# Patient Record
Sex: FEMALE | Race: WHITE | NOT HISPANIC OR LATINO | Employment: FULL TIME | URBAN - NONMETROPOLITAN AREA
[De-identification: names, ages, dates, MRNs, and addresses within clinical notes are randomized per-mention and may not be internally consistent; named-entity substitution may affect disease eponyms.]

---

## 2017-11-25 ENCOUNTER — OFFICE VISIT (OUTPATIENT)
Dept: RETAIL CLINIC | Facility: CLINIC | Age: 57
End: 2017-11-25

## 2017-11-25 VITALS — HEART RATE: 85 BPM | WEIGHT: 141 LBS | TEMPERATURE: 97.9 F | OXYGEN SATURATION: 98 % | RESPIRATION RATE: 18 BRPM

## 2017-11-25 DIAGNOSIS — J01.00 ACUTE NON-RECURRENT MAXILLARY SINUSITIS: Primary | ICD-10-CM

## 2017-11-25 PROCEDURE — 99203 OFFICE O/P NEW LOW 30 MIN: CPT | Performed by: NURSE PRACTITIONER

## 2017-11-25 RX ORDER — AMOXICILLIN 875 MG/1
875 TABLET, COATED ORAL 3 TIMES DAILY
Qty: 30 TABLET | Refills: 0 | Status: SHIPPED | OUTPATIENT
Start: 2017-11-25 | End: 2017-12-05

## 2017-11-25 RX ORDER — ALBUTEROL SULFATE 90 UG/1
2 AEROSOL, METERED RESPIRATORY (INHALATION) EVERY 6 HOURS PRN
Qty: 1 INHALER | Refills: 0 | Status: SHIPPED | OUTPATIENT
Start: 2017-11-25 | End: 2017-12-02

## 2017-11-25 NOTE — PROGRESS NOTES
URI      Subjective   Joyce Hennessy is a 57 y.o. female.     URI    This is a new problem. Episode onset: 1 week ago  There has been no fever. Associated symptoms include congestion, coughing, rhinorrhea and a sore throat (dry; scratchy ). Pertinent negatives include no abdominal pain, chest pain, diarrhea, ear pain, headaches, nausea, rash, sinus pain, vomiting or wheezing. Treatments tried: Gretchen [place], Tylenol; Claritin D. The treatment provided mild relief.    Has not had influenza vaccine.  Ill contacts at work.  States that Dr. Campbell usually gives her Levaquin.  See ROS.     There is no problem list on file for this patient.      No Known Allergies     No current outpatient prescriptions on file prior to visit.     No current facility-administered medications on file prior to visit.        Past Medical History:   Diagnosis Date   • Allergic    • Bronchitis    • Sinusitis        Past Surgical History:   Procedure Laterality Date   • CARPAL TUNNEL RELEASE Right 2000   • CHOLECYSTECTOMY  2008   • HYSTERECTOMY  12/2001       Family History   Problem Relation Age of Onset   • Heart disease Father    • Hypertension Mother    • No Known Problems Sister    • No Known Problems Brother    • No Known Problems Brother    • No Known Problems Brother        Social History     Social History   • Marital status:      Spouse name: N/A   • Number of children: N/A   • Years of education: N/A     Occupational History   • Not on file.     Social History Main Topics   • Smoking status: Never Smoker   • Smokeless tobacco: Never Used   • Alcohol use No   • Drug use: No   • Sexual activity: Defer     Other Topics Concern   • Not on file     Social History Narrative   • No narrative on file       Travel:  No recent travel within the last 21 days outside the [place] Denies recent travel to one of the following [place]  Countries:  [place], [place], [place], or [place].  Denies contact with anyone who has traveled to  one of the following [place]  Countries: [place], [place], [place], or [place] within the last 21 days and is known or suspected to have Ebola.  Denies having had any contact with the human remains, blood or any bodily fluids of someone who is known or suspected to have Ebola within the last 21 days.     OB History     No data available          Review of Systems   Constitutional: Negative for chills, diaphoresis and fever.   HENT: Positive for congestion, hearing loss (fullness; R>L), postnasal drip, rhinorrhea, sinus pressure, sore throat (dry; scratchy ) and voice change (hoarseness ). Negative for ear discharge, ear pain, mouth sores, nosebleeds and sinus pain.    Respiratory: Positive for cough and chest tightness. Negative for shortness of breath and wheezing.    Cardiovascular: Negative for chest pain.   Gastrointestinal: Negative for abdominal pain, diarrhea, nausea and vomiting.   Musculoskeletal: Negative for myalgias.   Skin: Negative for rash.   Neurological: Negative for dizziness and headaches.       Pulse 85  Temp 97.9 °F (36.6 °C) (Oral)   Resp 18  Wt 141 lb (64 kg)  SpO2 98%  Breastfeeding? No    Objective   Physical Exam   Constitutional: She is oriented to person, place, and time. She appears well-developed and well-nourished. She is cooperative. She appears ill (mild ). No distress.   HENT:   Head: Normocephalic and atraumatic.   Right Ear: External ear and ear canal normal. Tympanic membrane is not perforated, not erythematous, not retracted and not bulging. A middle ear effusion (serous ) is present.   Left Ear: External ear and ear canal normal. Tympanic membrane is not perforated, not erythematous, not retracted and not bulging. A middle ear effusion (serous ) is present.   Nose: No rhinorrhea. Right sinus exhibits maxillary sinus tenderness and frontal sinus tenderness. Left sinus exhibits maxillary sinus tenderness and frontal sinus tenderness.   Mouth/Throat: Uvula is midline,  oropharynx is clear and moist and mucous membranes are normal. No tonsillar exudate.   Bilateral nasal congestion, clear PND   Cardiovascular: Normal rate, regular rhythm and normal heart sounds.    Pulmonary/Chest: Effort normal and breath sounds normal.   Lymphadenopathy:        Head (right side): Tonsillar adenopathy present.        Head (left side): Tonsillar adenopathy present.     She has no cervical adenopathy.   Neurological: She is alert and oriented to person, place, and time.   Skin: Skin is warm, dry and intact. No rash noted.       Assessment/Plan   Joyce was seen today for uri.    Diagnoses and all orders for this visit:    Acute non-recurrent maxillary sinusitis  -     amoxicillin (AMOXIL) 875 MG tablet; Take 1 tablet by mouth 3 (Three) Times a Day for 10 days.  -     albuterol (PROVENTIL HFA;VENTOLIN HFA) 108 (90 Base) MCG/ACT inhaler; Inhale 2 puffs Every 6 (Six) Hours As Needed for Wheezing or Shortness of Air for up to 7 days.      Discussed physical examination findings with patient today.  Increase water intake.  Continue taking Claritin D for congestion and fluid behind TMs. Tylenol or Motrin ok for pain.  Follow up with PCP if symptoms worsen or do not improve.  Visit summary provided today.      Return if symptoms worsen or fail to improve.

## 2018-09-29 ENCOUNTER — OFFICE VISIT (OUTPATIENT)
Dept: RETAIL CLINIC | Facility: CLINIC | Age: 58
End: 2018-09-29

## 2018-09-29 VITALS
RESPIRATION RATE: 20 BRPM | WEIGHT: 140 LBS | TEMPERATURE: 97.4 F | DIASTOLIC BLOOD PRESSURE: 68 MMHG | SYSTOLIC BLOOD PRESSURE: 110 MMHG | OXYGEN SATURATION: 97 % | HEART RATE: 61 BPM

## 2018-09-29 DIAGNOSIS — H65.06 RECURRENT ACUTE SEROUS OTITIS MEDIA OF BOTH EARS: ICD-10-CM

## 2018-09-29 DIAGNOSIS — J01.40 ACUTE NON-RECURRENT PANSINUSITIS: Primary | ICD-10-CM

## 2018-09-29 PROCEDURE — 99213 OFFICE O/P EST LOW 20 MIN: CPT | Performed by: NURSE PRACTITIONER

## 2018-09-29 RX ORDER — FLUTICASONE PROPIONATE 50 MCG
2 SPRAY, SUSPENSION (ML) NASAL DAILY
COMMUNITY

## 2018-09-29 RX ORDER — PREDNISONE 20 MG/1
TABLET ORAL
Qty: 6 TABLET | Refills: 0 | Status: SHIPPED | OUTPATIENT
Start: 2018-09-29

## 2018-09-29 RX ORDER — MONTELUKAST SODIUM 10 MG/1
10 TABLET ORAL NIGHTLY
COMMUNITY

## 2018-09-29 RX ORDER — AMOXICILLIN AND CLAVULANATE POTASSIUM 875; 125 MG/1; MG/1
1 TABLET, FILM COATED ORAL EVERY 12 HOURS SCHEDULED
Qty: 20 TABLET | Refills: 0 | Status: SHIPPED | OUTPATIENT
Start: 2018-09-29 | End: 2018-10-09

## 2018-09-29 NOTE — PATIENT INSTRUCTIONS
Eustachian Tube Dysfunction  The eustachian tube connects the middle ear to the back of the nose. It regulates air pressure in the middle ear by allowing air to move between the ear and nose. It also helps to drain fluid from the middle ear space. When the eustachian tube does not function properly, air pressure, fluid, or both can build up in the middle ear.  Eustachian tube dysfunction can affect one or both ears.  What are the causes?  This condition happens when the eustachian tube becomes blocked or cannot open normally. This may result from:  · Ear infections.  · Colds and other upper respiratory infections.  · Allergies.  · Irritation, such as from cigarette smoke or acid from the stomach coming up into the esophagus (gastroesophageal reflux).  · Sudden changes in air pressure, such as from descending in an airplane.  · Abnormal growths in the nose or throat, such as nasal polyps, tumors, or enlarged tissue at the back of the throat (adenoids).    What increases the risk?  This condition may be more likely to develop in people who smoke and people who are overweight. Eustachian tube dysfunction may also be more likely to develop in children, especially children who have:  · Certain birth defects of the mouth, such as cleft palate.  · Large tonsils and adenoids.    What are the signs or symptoms?  Symptoms of this condition may include:  · A feeling of fullness in the ear.  · Ear pain.  · Clicking or popping noises in the ear.  · Ringing in the ear.  · Hearing loss.  · Loss of balance.    Symptoms may get worse when the air pressure around you changes, such as when you travel to an area of high elevation or fly on an airplane.  How is this diagnosed?  This condition may be diagnosed based on:  · Your symptoms.  · A physical exam of your ear, nose, and throat.  · Tests, such as those that measure:  ? The movement of your eardrum (tympanogram).  ? Your hearing (audiometry).    How is this treated?  Treatment  "depends on the cause and severity of your condition. If your symptoms are mild, you may be able to relieve your symptoms by moving air into (\"popping\") your ears. If you have symptoms of fluid in your ears, treatment may include:  · Decongestants.  · Antihistamines.  · Nasal sprays or ear drops that contain medicines that reduce swelling (steroids).    In some cases, you may need to have a procedure to drain the fluid in your eardrum (myringotomy). In this procedure, a small tube is placed in the eardrum to:  · Drain the fluid.  · Restore the air in the middle ear space.    Follow these instructions at home:  · Take over-the-counter and prescription medicines only as told by your health care provider.  · Use techniques to help pop your ears as recommended by your health care provider. These may include:  ? Chewing gum.  ? Yawning.  ? Frequent, forceful swallowing.  ? Closing your mouth, holding your nose closed, and gently blowing as if you are trying to blow air out of your nose.  · Do not do any of the following until your health care provider approves:  ? Travel to high altitudes.  ? Fly in airplanes.  ? Work in a pressurized cabin or room.  ? Scuba dive.  · Keep your ears dry. Dry your ears completely after showering or bathing.  · Do not smoke.  · Keep all follow-up visits as told by your health care provider. This is important.  Contact a health care provider if:  · Your symptoms do not go away after treatment.  · Your symptoms come back after treatment.  · You are unable to pop your ears.  · You have:  ? A fever.  ? Pain in your ear.  ? Pain in your head or neck.  ? Fluid draining from your ear.  · Your hearing suddenly changes.  · You become very dizzy.  · You lose your balance.  This information is not intended to replace advice given to you by your health care provider. Make sure you discuss any questions you have with your health care provider.  Document Released: 01/13/2017 Document Revised: 05/25/2017 " Document Reviewed: 01/06/2016  [employer] Interactive Patient Education © 2018 [employer]  Sinusitis, Adult  Sinusitis is soreness and inflammation of your sinuses. Sinuses are hollow spaces in the bones around your face. Your sinuses are located:  · Around your eyes.  · In the middle of your forehead.  · Behind your nose.  · In your cheekbones.    Your sinuses and nasal passages are lined with a stringy fluid (mucus). Mucus normally drains out of your sinuses. When your nasal tissues become inflamed or swollen, the mucus can become trapped or blocked so air cannot flow through your sinuses. This allows bacteria, viruses, and funguses to grow, which leads to infection.  Sinusitis can develop quickly and last for 7?10 days (acute) or for more than 12 weeks (chronic). Sinusitis often develops after a cold.  What are the causes?  This condition is caused by anything that creates swelling in the sinuses or stops mucus from draining, including:  · Allergies.  · Asthma.  · Bacterial or viral infection.  · Abnormally shaped bones between the nasal passages.  · Nasal growths that contain mucus (nasal polyps).  · Narrow sinus openings.  · Pollutants, such as chemicals or irritants in the air.  · A foreign object stuck in the nose.  · A fungal infection. This is rare.    What increases the risk?  The following factors may make you more likely to develop this condition:  · Having allergies or asthma.  · Having had a recent cold or respiratory tract infection.  · Having structural deformities or blockages in your nose or sinuses.  · Having a weak immune system.  · Doing a lot of swimming or diving.  · Overusing nasal sprays.  · Smoking.    What are the signs or symptoms?  The main symptoms of this condition are pain and a feeling of pressure around the affected sinuses. Other symptoms include:  · Upper toothache.  · Earache.  · Headache.  · Bad breath.  · Decreased sense of smell and taste.  · A cough that may get worse at  night.  · Fatigue.  · Fever.  · Thick drainage from your nose. The drainage is often green and it may contain pus (purulent).  · Stuffy nose or congestion.  · Postnasal drip. This is when extra mucus collects in the throat or back of the nose.  · Swelling and warmth over the affected sinuses.  · Sore throat.  · Sensitivity to light.    How is this diagnosed?  This condition is diagnosed based on symptoms, a medical history, and a physical exam. To find out if your condition is acute or chronic, your health care provider may:  · Look in your nose for signs of nasal polyps.  · Tap over the affected sinus to check for signs of infection.  · View the inside of your sinuses using an imaging device that has a light attached (endoscope).    If your health care provider suspects that you have chronic sinusitis, you may also:  · Be tested for allergies.  · Have a sample of mucus taken from your nose (nasal culture) and checked for bacteria.  · Have a mucus sample examined to see if your sinusitis is related to an allergy.    If your sinusitis does not respond to treatment and it lasts longer than 8 weeks, you may have an MRI or CT scan to check your sinuses. These scans also help to determine how severe your infection is.  In rare cases, a bone biopsy may be done to rule out more serious types of fungal sinus disease.  How is this treated?  Treatment for sinusitis depends on the cause and whether your condition is chronic or acute. If a virus is causing your sinusitis, your symptoms will go away on their own within 10 days. You may be given medicines to relieve your symptoms, including:  · Topical nasal decongestants. They shrink swollen nasal passages and let mucus drain from your sinuses.  · Antihistamines. These drugs block inflammation that is triggered by allergies. This can help to ease swelling in your nose and sinuses.  · Topical nasal corticosteroids. These are nasal sprays that ease inflammation and swelling in  your nose and sinuses.  · Nasal saline washes. These rinses can help to get rid of thick mucus in your nose.    If your condition is caused by bacteria, you will be given an antibiotic medicine. If your condition is caused by a fungus, you will be given an antifungal medicine.  Surgery may be needed to correct underlying conditions, such as narrow nasal passages. Surgery may also be needed to remove polyps.  Follow these instructions at home:  Medicines  · Take, use, or apply over-the-counter and prescription medicines only as told by your health care provider. These may include nasal sprays.  · If you were prescribed an antibiotic medicine, take it as told by your health care provider. Do not stop taking the antibiotic even if you start to feel better.  Hydrate and Humidify  · Drink enough water to keep your urine clear or pale yellow. Staying hydrated will help to thin your mucus.  · Use a cool mist humidifier to keep the humidity level in your home above 50%.  · Inhale steam for 10-15 minutes, 3-4 times a day or as told by your health care provider. You can do this in the bathroom while a hot shower is running.  · Limit your exposure to cool or dry air.  Rest  · Rest as much as possible.  · Sleep with your head raised (elevated).  · Make sure to get enough sleep each night.  General instructions  · Apply a warm, moist washcloth to your face 3-4 times a day or as told by your health care provider. This will help with discomfort.  · Wash your hands often with soap and water to reduce your exposure to viruses and other germs. If soap and water are not available, use hand .  · Do not smoke. Avoid being around people who are smoking (secondhand smoke).  · Keep all follow-up visits as told by your health care provider. This is important.  Contact a health care provider if:  · You have a fever.  · Your symptoms get worse.  · Your symptoms do not improve within 10 days.  Get help right away if:  · You have a  severe headache.  · You have persistent vomiting.  · You have pain or swelling around your face or eyes.  · You have vision problems.  · You develop confusion.  · Your neck is stiff.  · You have trouble breathing.  This information is not intended to replace advice given to you by your health care provider. Make sure you discuss any questions you have with your health care provider.  Document Released: 12/18/2006 Document Revised: 08/13/2017 Document Reviewed: 10/12/2016  [employer] Interactive Patient Education © 2018 [employer]

## 2018-09-29 NOTE — PROGRESS NOTES
Earache      Subjective   Joyce Hennessy is a 57 y.o. female.     Earache    This is a recurrent problem. Episode onset: 1 month ago  The problem has been waxing and waning. Associated symptoms include coughing, headaches, rhinorrhea and a sore throat (raw; irritated ). Pertinent negatives include no abdominal pain, diarrhea, ear discharge, rash or vomiting. Treatments tried: Singulair; Flonase, Claritin D  The treatment provided mild relief.    Went to see Doctor at work 1 month ago and diagnosed with allergies and eustachian tube dysfunction.  No  Recent vaccines.  Has not had influenza vaccine.  See ROS.      There is no problem list on file for this patient.      No Known Allergies     Current Outpatient Prescriptions on File Prior to Visit   Medication Sig Dispense Refill   • Ascorbic Acid (VITAMIN C ER PO) Take  by mouth.     • Multiple Vitamins-Minerals (MULTIVITAMIN ADULT PO) Take  by mouth.       No current facility-administered medications on file prior to visit.        Past Medical History:   Diagnosis Date   • Allergic    • Bronchitis    • Sinusitis        Past Surgical History:   Procedure Laterality Date   • CARPAL TUNNEL RELEASE Right 2000   • CHOLECYSTECTOMY  2008   • HYSTERECTOMY  12/2001       Family History   Problem Relation Age of Onset   • Heart disease Father    • Hypertension Mother    • No Known Problems Sister    • No Known Problems Brother    • No Known Problems Brother    • No Known Problems Brother        Social History     Social History   • Marital status:      Spouse name: N/A   • Number of children: N/A   • Years of education: N/A     Occupational History   • Not on file.     Social History Main Topics   • Smoking status: Never Smoker   • Smokeless tobacco: Never Used   • Alcohol use No   • Drug use: No   • Sexual activity: Defer     Other Topics Concern   • Not on file     Social History Narrative   • No narrative on file       Travel:  No recent travel within the last 21  days outside the [place] Denies recent travel to one of the following [place]ries:  [place], [place], [place], or [place].  Denies contact with anyone who has traveled to one of the following [place]  Countries: [place], [place], [place], or [place] within the last 21 days and is known or suspected to have Ebola.  Denies having had any contact with the human remains, blood or any bodily fluids of someone who is known or suspected to have Ebola within the last 21 days.     OB History     No data available          Review of Systems   Constitutional: Negative for chills, diaphoresis and fever.   HENT: Positive for congestion, postnasal drip, rhinorrhea, sinus pain, sinus pressure, sneezing and sore throat (raw; irritated ). Negative for dental problem, ear discharge, ear pain (ear fullness ), mouth sores, nosebleeds and voice change.    Respiratory: Positive for cough and chest tightness. Negative for shortness of breath and wheezing.    Cardiovascular: Negative for chest pain.   Gastrointestinal: Negative for abdominal pain, diarrhea, nausea and vomiting.   Musculoskeletal: Negative for myalgias.   Skin: Negative for rash.   Neurological: Positive for headaches.       /68 (BP Location: Right arm, Patient Position: Sitting, Cuff Size: Adult)   Pulse 61   Temp 97.4 °F (36.3 °C) (Temporal Artery )   Resp 20   Wt 63.5 kg (140 lb)   SpO2 97%     Objective   Physical Exam   Constitutional: She is oriented to person, place, and time. Vital signs are normal. She appears well-developed and well-nourished. She is cooperative. She does not appear ill. No distress.   HENT:   Head: Normocephalic.   Right Ear: External ear and ear canal normal. Tympanic membrane is not scarred, not perforated, not erythematous, not retracted and not bulging. A middle ear effusion (moderate serous effusion ) is present.   Left Ear: External ear and ear canal normal. Tympanic membrane is not scarred, not perforated, not  erythematous, not retracted and not bulging. A middle ear effusion (moderate serous effusion ) is present.   Nose: Right sinus exhibits maxillary sinus tenderness and frontal sinus tenderness. Left sinus exhibits maxillary sinus tenderness and frontal sinus tenderness.   Mouth/Throat: Uvula is midline, oropharynx is clear and moist and mucous membranes are normal. No posterior oropharyngeal edema or posterior oropharyngeal erythema. No tonsillar exudate.   Moderate nasal congestion    Cardiovascular: Normal rate, regular rhythm and normal heart sounds.    Pulmonary/Chest: Effort normal and breath sounds normal. She has no decreased breath sounds. She has no wheezes. She has no rhonchi. She has no rales.   Lymphadenopathy:        Head (right side): Tonsillar adenopathy present. No preauricular and no posterior auricular adenopathy present.        Head (left side): Tonsillar adenopathy present. No preauricular and no posterior auricular adenopathy present.     She has no cervical adenopathy.   Neurological: She is alert and oriented to person, place, and time.   Skin: Skin is warm, dry and intact. No rash noted.       Assessment/Plan   Joyce was seen today for earache.    Diagnoses and all orders for this visit:    Acute non-recurrent pansinusitis  -     predniSONE (DELTASONE) 20 MG tablet; Take 2 tablets PO daily x 3 days  -     amoxicillin-clavulanate (AUGMENTIN) 875-125 MG per tablet; Take 1 tablet by mouth Every 12 (Twelve) Hours for 10 days.    Recurrent acute serous otitis media of both ears  -     predniSONE (DELTASONE) 20 MG tablet; Take 2 tablets PO daily x 3 days    Increase water intake. Rest.  Take prednisone with food and/or milk.  Follow up with PCP if symptoms persist or worsen.  Visit summary provided today.  Questions/concerns addressed.      No results found for this or any previous visit.    Return if symptoms worsen or fail to improve.

## 2024-11-07 ENCOUNTER — OFFICE VISIT (OUTPATIENT)
Dept: NEUROLOGY | Facility: CLINIC | Age: 64
End: 2024-11-07
Payer: MEDICARE

## 2024-11-07 VITALS
HEIGHT: 62 IN | HEART RATE: 78 BPM | TEMPERATURE: 98 F | WEIGHT: 108 LBS | SYSTOLIC BLOOD PRESSURE: 114 MMHG | DIASTOLIC BLOOD PRESSURE: 78 MMHG | OXYGEN SATURATION: 96 % | BODY MASS INDEX: 19.88 KG/M2

## 2024-11-07 DIAGNOSIS — R29.898 WEAKNESS OF BOTH UPPER EXTREMITIES: ICD-10-CM

## 2024-11-07 DIAGNOSIS — R25.8 BRADYKINESIA: ICD-10-CM

## 2024-11-07 DIAGNOSIS — R29.898 WEAKNESS OF BOTH LOWER EXTREMITIES: ICD-10-CM

## 2024-11-07 DIAGNOSIS — R26.9 GAIT ABNORMALITY: Primary | ICD-10-CM

## 2024-11-07 RX ORDER — TRIAMTERENE AND HYDROCHLOROTHIAZIDE 37.5; 25 MG/1; MG/1
TABLET ORAL
COMMUNITY

## 2024-11-07 RX ORDER — LORATADINE 10 MG/1
1 TABLET ORAL DAILY
COMMUNITY

## 2024-11-07 NOTE — PROGRESS NOTES
New Patient Office Visit      Patient Name: Joyce Hennessy  : 1960   MRN: [id number]     Referring Physician: Leandra Martines A*    Chief Complaint:    Chief Complaint   Patient presents with   • Establish Care     Weakness BLE x 6-8 months; patient has h/o hypokalemia; patient also reports BUE weakness (right worse than left)       History of Present Illness: Joyce Hennessy is a 64 y.o. female who is here today to establish care with Neurology.  Is accompanied to the office visit today by her daughter, Vangie.  She says she had weakness in Oct 2023 and states her K+ had been running low. She has had this several times since. She says this is about the time her weakness started on the right side. She also noted her B12 was low at that time too and had injections. She is now on oral B vitamin. She says she has weakness in general but more noted on the right side.   Denies any changes in speech, denies any dizziness, no visual changes like double vision or blurred vision. She says she has some occasional tremors in both hands when she is up and about but never at rest. She has difficulty cutting her food and removing lids.       Pertinent Medical History: Hysterectomy, vitamin B deficiency    Subjective      Review of Systems:   Review of Systems   Musculoskeletal:  Positive for gait problem.       Past Medical History:   Past Medical History:   Diagnosis Date   • Allergic    • Bronchitis    • Hypokalemia    • Sinusitis        Past Surgical History:   Past Surgical History:   Procedure Laterality Date   • CARPAL TUNNEL RELEASE Right    • CHOLECYSTECTOMY     • HYSTERECTOMY  2001       Family History:   Family History   Problem Relation Age of Onset   • Heart disease Father    • Hypertension Mother    • No Known Problems Sister    • No Known Problems Brother    • No Known Problems Brother    • No Known Problems Brother        Social History:   Social History     Socioeconomic History  "  • Marital status:    Tobacco Use   • Smoking status: Never   • Smokeless tobacco: Never   Substance and Sexual Activity   • Alcohol use: No   • Drug use: No   • Sexual activity: Defer     Birth control/protection: Surgical, Post-menopausal       Medications:     Current Outpatient Medications:   •  Ascorbic Acid (VITAMIN C ER PO), Take  by mouth., Disp: , Rfl:   •  B Complex Vitamins (VITAMIN B COMPLEX PO), Take  by mouth., Disp: , Rfl:   •  fluticasone (FLONASE) 50 MCG/ACT nasal spray, Administer 2 sprays into the nostril(s) as directed by provider Daily., Disp: , Rfl:   •  loratadine (CLARITIN) 10 MG tablet, Take 1 tablet by mouth Daily., Disp: , Rfl:   •  montelukast (SINGULAIR) 10 MG tablet, Take 1 tablet by mouth Every Night., Disp: , Rfl:   •  Multiple Vitamins-Minerals (MULTIVITAMIN ADULT PO), Take  by mouth., Disp: , Rfl:   •  omeprazole (priLOSEC) 20 MG capsule, Take 1 capsule by mouth Daily., Disp: , Rfl:   •  triamterene-hydrochlorothiazide (MAXZIDE-25) 37.5-25 MG per tablet, take one (1) tablet by mouth every day, Disp: , Rfl:     Allergies:   No Known Allergies    Objective     Physical Exam:  Vital Signs:   Vitals:    11/07/24 1004   BP: 114/78   Pulse: 78   Temp: 98 °F (36.7 °C)   SpO2: 96%   Weight: 49 kg (108 lb)   Height: 157.5 cm (62\")   PainSc: 0-No pain     Body mass index is 19.75 kg/m².     Physical Exam  Constitutional:       Appearance: Normal appearance.   HENT:      Head: Normocephalic.   Eyes:      General: Lids are normal.      Extraocular Movements: Extraocular movements intact.      Conjunctiva/sclera: Conjunctivae normal.   Pulmonary:      Effort: Pulmonary effort is normal.   Musculoskeletal:         General: Normal range of motion.   Skin:     General: Skin is warm and dry.   Neurological:      General: No focal deficit present.      Mental Status: She is alert and oriented to person, place, and time.      Cranial Nerves: Cranial nerves 2-12 are intact. No dysarthria.      " "Motor: Motor function is intact. No tremor or pronator drift.      Coordination: Coordination is intact. Romberg sign negative. Finger-Nose-Finger Test normal.      Gait: Gait abnormal.      Deep Tendon Reflexes:      Reflex Scores:       Bicep reflexes are 3+ on the right side and 3+ on the left side.       Brachioradialis reflexes are 3+ on the right side and 3+ on the left side.       Patellar reflexes are 3+ on the right side and 3+ on the left side.     Comments: Patient is able to go from sitting to standing on first attempt while pushing up off of the chair.  Her gait is forward flexed.  She takes short steps.  She is able to complete a turn in 4 steps.  She notes that today is \"a good day \".  No tremor noted with ambulation or at rest.  She does have a fine hand tremor when assessing hand    Psychiatric:         Attention and Perception: Attention normal.         Mood and Affect: Mood normal. Affect is flat.         Speech: Speech normal.         Behavior: Behavior normal. Behavior is cooperative.         Thought Content: Thought content normal.         Cognition and Memory: Cognition normal.         Judgment: Judgment normal.         Neurological Exam  Mental Status  Alert. Oriented to person, place, time and situation. Oriented to person, place, and time. Recent and remote memory are intact. Speech is normal. no dysarthria present. Language is fluent with no aphasia. Attention and concentration are normal.    Cranial Nerves  CN III, IV, VI: Extraocular movements intact bilaterally. Normal lids and orbits bilaterally.  CN V: Facial sensation is normal.  CN VII: Full and symmetric facial movement.  CN IX, X: Palate elevates symmetrically  CN XI: Shoulder shrug strength is normal.  CN XII: Tongue midline without atrophy or fasciculations.    Motor  Decreased muscle bulk throughout. No fasciculations present. Decreased muscle tone. No abnormal involuntary movements.                                         "       Right                     Left  Deltoid                                   3                          3   Biceps                                   3                          3   Triceps                                  3                          3   Quadriceps                           3-                          3-   Hamstring                             3-                          3-   Gastrocnemius                     3-                           3-   Anterior tibialis                      3-                          3-   Posterior tibialis                    3-                          3-   Peroneal                               3-                          3-    Sensory  Sensation is intact to light touch, pinprick, vibration and proprioception in all four extremities.    Reflexes                                            Right                      Left  Brachioradialis                    3+                         3+  Biceps                                 3+                         3+  Patellar                                3+                         3+    Coordination    Finger-to-nose, rapid alternating movements and heel-to-shin normal bilaterally without dysmetria.No tremor    Gait   Abnormal gait. Romberg is absent.      Assessment / Plan      Assessment/Plan:   Diagnoses and all orders for this visit:    1. Gait abnormality (Primary)  -     Ambulatory Referral to Physical Therapy for Evaluation & Treatment  -     MRI Brain With & Without Contrast; Future    2. Weakness of both lower extremities  -     Ambulatory Referral to Physical Therapy for Evaluation & Treatment  -     MRI Brain With & Without Contrast; Future    3. Weakness of both upper extremities  -     Ambulatory Referral to Physical Therapy for Evaluation & Treatment  -     MRI Brain With & Without Contrast; Future    4. Bradykinesia     MRI of the brain with and without contrast has been ordered as she had a CT scan that was  noncontributory last month which said to consider MRI for clinical concern.  Due to patient's pronounced weakness in her upper and lower extremities I feel MRI is warranted to rule out any demyelinating disease as well as any vascular or structural abnormality that may be contributing to her symptoms.  Would like to rule out old CVA.  Also long discussion with patient and her daughter regarding parkinsonian features.  Patient has gait abnormality and bradykinesia.  Will send to physical therapy for strengthening and gait training.  She denies any falls in the last 6 months.  She does not walk with  any assistive device.  Discussed DaTscan with patient and her daughter.  They recommend this at her follow-up appointment.  She has had labs from her PCP on 10/7/2023 these were reviewed today including ferritin, vitamin D, folate, B12, TSH, iron profile, CBC and CMP were all noncontributory.  Her lipid panel revealed total cholesterol 213 and LDL of 104.     Patient will call in the interim if she has any questions or concerns or changes.    Follow Up:   Return in about 2 months (around 1/7/2025).    HOSSEIN Rodriguez, FNP-[facility] Neurology and Sleep Medicine

## 2025-02-06 ENCOUNTER — HOSPITAL ENCOUNTER (OUTPATIENT)
Dept: MRI IMAGING | Facility: HOSPITAL | Age: 65
Discharge: HOME | End: 2025-02-06
Admitting: NURSE PRACTITIONER
Payer: MEDICARE

## 2025-02-06 DIAGNOSIS — R29.898 WEAKNESS OF BOTH LOWER EXTREMITIES: ICD-10-CM

## 2025-02-06 DIAGNOSIS — R26.9 GAIT ABNORMALITY: ICD-10-CM

## 2025-02-06 DIAGNOSIS — R29.898 WEAKNESS OF BOTH UPPER EXTREMITIES: ICD-10-CM

## 2025-02-06 PROCEDURE — 25510000002 GADOBENATE DIMEGLUMINE 529 MG/ML SOLUTION: Performed by: NURSE PRACTITIONER

## 2025-02-06 PROCEDURE — 70553 MRI BRAIN STEM W/O & W/DYE: CPT

## 2025-02-06 PROCEDURE — A9577 INJ MULTIHANCE: HCPCS | Performed by: NURSE PRACTITIONER

## 2025-02-06 RX ADMIN — GADOBENATE DIMEGLUMINE 9 ML: 529 INJECTION, SOLUTION INTRAVENOUS at 10:43

## 2025-02-10 ENCOUNTER — OFFICE VISIT (OUTPATIENT)
Dept: NEUROLOGY | Facility: CLINIC | Age: 65
End: 2025-02-10
Payer: MEDICARE

## 2025-02-10 VITALS
WEIGHT: 110 LBS | HEART RATE: 90 BPM | HEIGHT: 62 IN | TEMPERATURE: 97.8 F | BODY MASS INDEX: 20.24 KG/M2 | SYSTOLIC BLOOD PRESSURE: 114 MMHG | DIASTOLIC BLOOD PRESSURE: 70 MMHG

## 2025-02-10 DIAGNOSIS — R29.898 WEAKNESS OF BOTH UPPER EXTREMITIES: ICD-10-CM

## 2025-02-10 DIAGNOSIS — R25.8 BRADYKINESIA: Primary | ICD-10-CM

## 2025-02-10 DIAGNOSIS — R29.818 PARKINSONIAN FEATURES: ICD-10-CM

## 2025-02-10 DIAGNOSIS — R29.898 WEAKNESS OF BOTH LOWER EXTREMITIES: ICD-10-CM

## 2025-02-10 PROCEDURE — 99214 OFFICE O/P EST MOD 30 MIN: CPT | Performed by: NURSE PRACTITIONER

## 2025-02-10 PROCEDURE — 1160F RVW MEDS BY RX/DR IN RCRD: CPT | Performed by: NURSE PRACTITIONER

## 2025-02-10 PROCEDURE — 1159F MED LIST DOCD IN RCRD: CPT | Performed by: NURSE PRACTITIONER

## 2025-02-10 RX ORDER — UBIDECARENONE 75 MG
50 CAPSULE ORAL DAILY
COMMUNITY

## 2025-02-10 RX ORDER — LEVOCETIRIZINE DIHYDROCHLORIDE 5 MG/1
TABLET, FILM COATED ORAL
COMMUNITY
Start: 2025-02-04

## 2025-02-10 RX ORDER — CARBIDOPA AND LEVODOPA 25; 100 MG/1; MG/1
1 TABLET ORAL 3 TIMES DAILY
Qty: 90 TABLET | Refills: 2 | Status: SHIPPED | OUTPATIENT
Start: 2025-02-10

## 2025-02-10 NOTE — PROGRESS NOTES
"     New Patient Office Visit      Patient Name: Joyce Hennessy  : 1960   MRN: [id number]     Referring Physician: No ref. provider found    Chief Complaint:    Chief Complaint   Patient presents with    Follow-up     Right sided weakness       History of Present Illness: Joyce Hennessy is a 64 y.o. female who is here today to follow up on weakness in her upper and lower ext's. She says her last K+ level was normal. She has been having trouble with it being low.  MRI of the brain was reviewed with patient today.  She is still having episodes of feeling \"stuck \"when she is trying to walk and turning a corner.  She is currently in physical therapy but says she had to cut back to once a week due to the cost with her new insurance.  She denies any falls.  No changes in smell.  She occasionally gets choked but says this usually when she is taking a pill that is large.  She has some occasional drooling.  Denies any diplopia, no constipation.  She does not use an assistive device with walking.  She is sleeping 6 to 7 hours at night.  Denies any hallucinations.  She has some occasional tremors in either hand but generally in the right.  She denies any resting tremor.  States sometimes she notices the tremor when she is trying to eat.  -She says she is now on a B12 supplement \"was on the low side of normal\".         -MRI brain W/WO 25  FINDINGS: On the sagittal images, midline structures appear intact.     On the T2 and FLAIR axial images, there is moderate abnormal signal  identified throughout the periventricular and subcortical white matter.  No discrete foci of cortical signal abnormality are seen.     There is no evidence of restricted diffusion.     On the postinfusion images, there is no evidence of abnormal  enhancement.     Visualized paranasal sinuses demonstrate normal signal voids.     IMPRESSION:  1. Moderate changes of chronic microvascular ischemia.     2. No evidence of acute ischemia.   "   3. No evidence of paranasal sinusitis.              Pertinent Medical History: Hysterectomy, vitamin B deficiency    Subjective      Review of Systems:   Review of Systems   Eyes:  Negative for blurred vision, double vision and visual disturbance.   Musculoskeletal:  Positive for gait problem.   Neurological:  Positive for tremors.       Past Medical History:   Past Medical History:   Diagnosis Date    Allergic     Bronchitis     Hypokalemia     Sinusitis        Past Surgical History:   Past Surgical History:   Procedure Laterality Date    CARPAL TUNNEL RELEASE Right 2000    CHOLECYSTECTOMY  2008    HYSTERECTOMY  12/2001       Family History:   Family History   Problem Relation Age of Onset    Heart disease Father     Hypertension Mother     No Known Problems Sister     No Known Problems Brother     No Known Problems Brother     No Known Problems Brother        Social History:   Social History     Socioeconomic History    Marital status:    Tobacco Use    Smoking status: Never    Smokeless tobacco: Never   Substance and Sexual Activity    Alcohol use: No    Drug use: No    Sexual activity: Defer     Birth control/protection: Surgical, Post-menopausal       Medications:     Current Outpatient Medications:     Ascorbic Acid (VITAMIN C ER PO), Take  by mouth., Disp: , Rfl:     B Complex Vitamins (VITAMIN B COMPLEX PO), Take  by mouth., Disp: , Rfl:     fluticasone (FLONASE) 50 MCG/ACT nasal spray, Administer 2 sprays into the nostril(s) as directed by provider Daily., Disp: , Rfl:     levocetirizine (XYZAL) 5 MG tablet, take one (1) tablet by mouth every day in the evening, Disp: , Rfl:     montelukast (SINGULAIR) 10 MG tablet, Take 1 tablet by mouth Every Night., Disp: , Rfl:     Multiple Vitamins-Minerals (MULTIVITAMIN ADULT PO), Take  by mouth., Disp: , Rfl:     omeprazole (priLOSEC) 20 MG capsule, Take 1 capsule by mouth Daily., Disp: , Rfl:     triamterene-hydrochlorothiazide (MAXZIDE-25) 37.5-25 MG per  "tablet, take one (1) tablet by mouth every day, Disp: , Rfl:     vitamin B-12 (CYANOCOBALAMIN) 100 MCG tablet, Take 0.5 tablets by mouth Daily., Disp: , Rfl:     carbidopa-levodopa (SINEMET)  MG per tablet, Take 1 tablet by mouth 3 (Three) Times a Day., Disp: 90 tablet, Rfl: 2    Allergies:   No Known Allergies    Objective     Physical Exam:  Vital Signs:   Vitals:    02/10/25 1010   BP: 114/70   Pulse: 90   Temp: 97.8 °F (36.6 °C)   Weight: 49.9 kg (110 lb)   Height: 157.5 cm (62\")   PainSc: 0-No pain     Body mass index is 20.12 kg/m².     Physical Exam  Constitutional:       Appearance: Normal appearance.   HENT:      Head: Normocephalic.   Eyes:      General: Lids are normal.      Extraocular Movements: Extraocular movements intact.      Conjunctiva/sclera: Conjunctivae normal.   Pulmonary:      Effort: Pulmonary effort is normal.   Musculoskeletal:         General: Normal range of motion.   Skin:     General: Skin is warm and dry.   Neurological:      General: No focal deficit present.      Mental Status: She is alert and oriented to person, place, and time.      Cranial Nerves: Cranial nerves 2-12 are intact. No dysarthria.      Motor: Motor function is intact. No tremor or pronator drift.      Coordination: Finger-Nose-Finger Test normal.      Gait: Gait abnormal.      Comments: Patient is able to go from sitting to standing on first attempt while pushing up off of the chair.  She has a slow guarded gait.  She is slightly flexed forward.  She does take short steps and is able to complete a turn in 4-5 steps.  She has little to no arm swing.  She has flexion at the hips and elbows.  Fine tremor to right outstretched hand.  No tremors during ambulation       Psychiatric:         Attention and Perception: Attention normal.         Mood and Affect: Mood normal. Affect is flat.         Speech: Speech normal.         Behavior: Behavior normal. Behavior is cooperative.         Thought Content: Thought content " normal.         Cognition and Memory: Cognition normal.         Judgment: Judgment normal.         Neurological Exam  Mental Status  Alert. Oriented to person, place, time and situation. Oriented to person, place, and time. Recent and remote memory are intact. Speech is normal. no dysarthria present. Language is fluent with no aphasia. Attention and concentration are normal.    Cranial Nerves  CN III, IV, VI: Extraocular movements intact bilaterally. Normal lids and orbits bilaterally.  CN V: Facial sensation is normal.  CN VII: Full and symmetric facial movement.  CN IX, X: Palate elevates symmetrically  CN XI: Shoulder shrug strength is normal.  CN XII: Tongue midline without atrophy or fasciculations.    Motor  Decreased muscle bulk throughout. No fasciculations present. Decreased muscle tone. No abnormal involuntary movements.                                               Right                     Left  Deltoid                                   3+                          3+   Biceps                                   3+                          3+   Triceps                                  3+                          3+   Quadriceps                           3                          3   Hamstring                             3                          3   Anterior tibialis                      3                          3   Posterior tibialis                    3                          3    Sensory  Sensation is intact to light touch, pinprick, vibration and proprioception in all four extremities.    Coordination  No tremorRight: Alternating movements of her hands and fingers are slowed.  She is also slowed when asking to tap her foot (bilaterally).    Gait   Abnormal gait.      Assessment / Plan      Assessment/Plan:   Diagnoses and all orders for this visit:    1. Bradykinesia (Primary)  -     carbidopa-levodopa (SINEMET)  MG per tablet; Take 1 tablet by mouth 3 (Three) Times a Day.  Dispense: 90  tablet; Refill: 2    2. Parkinsonian features  -     carbidopa-levodopa (SINEMET)  MG per tablet; Take 1 tablet by mouth 3 (Three) Times a Day.  Dispense: 90 tablet; Refill: 2    3. Weakness of both lower extremities    4. Weakness of both upper extremities      MRI of the brain was reviewed with patient today.  We also discussed DaTscan but patient states since her daughter has moved out of state her and her  do not generally drive to [place] so she declines this.  She is open to trying carbidopa levodopa which has been prescribed today.  We discussed taking at the same time every day and also avoiding protein when taking.  She is able to verbalize understanding.  She will continue with physical therapy.   Patient will call in the interim if she has any questions or concerns or changes.  She would like to follow-up at the relocation.    Follow Up:   Return in about 2 months (around 4/10/2025).    HOSSEIN Rodriguez, FNP-BC  [facility] Neurology and Sleep Medicine

## 2025-05-07 DIAGNOSIS — R29.818 PARKINSONIAN FEATURES: ICD-10-CM

## 2025-05-07 DIAGNOSIS — R25.8 BRADYKINESIA: ICD-10-CM

## 2025-05-07 RX ORDER — CARBIDOPA AND LEVODOPA 25; 100 MG/1; MG/1
1 TABLET ORAL 3 TIMES DAILY
Qty: 90 TABLET | Refills: 2 | Status: SHIPPED | OUTPATIENT
Start: 2025-05-07

## 2025-05-07 NOTE — TELEPHONE ENCOUNTER
Caller: Joyce Hennessy    Relationship: Self    Best call back number: [phone]    Requested Prescriptions:   Requested Prescriptions     Pending Prescriptions Disp Refills    carbidopa-levodopa (SINEMET)  MG per tablet 90 tablet 2     Sig: Take 1 tablet by mouth 3 (Three) Times a Day.      Pharmacy where request should be sent: [facility] [address] - [phone] [facility] [phone] [place]     Last office visit with prescribing clinician: 2/10/2025   Last telemedicine visit with prescribing clinician: Visit date not found   Next office visit with prescribing clinician: 5/20/2025     Additional details provided by patient: PT REPORTS SHE HAS APPROXIMATELY 4 DAYS OF THE CARBIDOPA-LEVODOPA MEDICATION LEFT.    Does the patient have less than a 3 day supply?:  [] Yes  [x] No    Would you like a call back once the refill request has been completed?: [] Yes  [x] No    If the office needs to give you a call back, can they leave a voicemail?: [] Yes  [x] No    PLEASE REVIEW AND ADVISE.    Lashae Sanchez Rep   05/07/25 11:46 EDT

## 2025-05-20 ENCOUNTER — OFFICE VISIT (OUTPATIENT)
Dept: NEUROLOGY | Facility: CLINIC | Age: 65
End: 2025-05-20
Payer: MEDICARE

## 2025-05-20 VITALS
DIASTOLIC BLOOD PRESSURE: 80 MMHG | SYSTOLIC BLOOD PRESSURE: 122 MMHG | HEART RATE: 64 BPM | TEMPERATURE: 98.2 F | BODY MASS INDEX: 21.71 KG/M2 | WEIGHT: 118 LBS | OXYGEN SATURATION: 100 % | HEIGHT: 62 IN

## 2025-05-20 DIAGNOSIS — R29.818 PARKINSONIAN FEATURES: Primary | ICD-10-CM

## 2025-05-20 DIAGNOSIS — R25.8 BRADYKINESIA: ICD-10-CM

## 2025-05-20 RX ORDER — CARBIDOPA AND LEVODOPA 25; 100 MG/1; MG/1
1 TABLET ORAL 3 TIMES DAILY
Qty: 90 TABLET | Refills: 3 | Status: SHIPPED | OUTPATIENT
Start: 2025-05-20

## 2025-05-20 NOTE — PROGRESS NOTES
"     Follow Up Office Visit      Patient Name: Joyce Hennessy  : 1960   MRN: [id number]     Chief Complaint:    Chief Complaint   Patient presents with    Follow-up     Parkinsonian features; patient reports better balance as well as increased mobility RUE       History of Present Illness: Joyce Hennessy is a 64 y.o. female who is here today to follow up with Parkinsonian features. She was started on Sinemet at last OV and says she is doing better and says after a month she was able to tell a huge difference.  She denies any falls.  She says the medicine keeps her from feeling \"stuck \".  She says that she is able to get up out of the chair without pushing off the chair, her gait is no longer slowed \"I used to look worsening Granny walking and look at me now\".  She reports that she goes to the chiropractor and even they were able to tell a huge difference in her.  She has not noticed any tremors and does not feel bradykinetic with the carbidopa levodopa  3 times daily.  She is taking this at 8 AM, 1 PM and 7 PM.    History of Present Illness has been carried forward from her 2/10/2025 office note as follows: Joyce Hennessy is a 64 y.o. female who is here today to follow up on weakness in her upper and lower ext's. She says her last K+ level was normal. She has been having trouble with it being low.  MRI of the brain was reviewed with patient today.  She is still having episodes of feeling \"stuck \"when she is trying to walk and turning a corner.  She is currently in physical therapy but says she had to cut back to once a week due to the cost with her new insurance.  She denies any falls.  No changes in smell.  She occasionally gets choked but says this usually when she is taking a pill that is large.  She has some occasional drooling.  Denies any diplopia, no constipation.  She does not use an assistive device with walking.  She is sleeping 6 to 7 hours at night.  Denies any hallucinations.  She " "has some occasional tremors in either hand but generally in the right.  She denies any resting tremor.  States sometimes she notices the tremor when she is trying to eat.  -She says she is now on a B12 supplement \"was on the low side of normal\".         -MRI brain W/WO 2/6/25  FINDINGS: On the sagittal images, midline structures appear intact.     On the T2 and FLAIR axial images, there is moderate abnormal signal  identified throughout the periventricular and subcortical white matter.  No discrete foci of cortical signal abnormality are seen.     There is no evidence of restricted diffusion.     On the postinfusion images, there is no evidence of abnormal  enhancement.     Visualized paranasal sinuses demonstrate normal signal voids.     IMPRESSION:  1. Moderate changes of chronic microvascular ischemia.     2. No evidence of acute ischemia.     3. No evidence of paranasal sinusitis.              Pertinent Medical History: Hysterectomy, vitamin B deficiency    Subjective      Review of Systems:   Review of Systems   Musculoskeletal:  Positive for gait problem.   Neurological:  Positive for tremors.       I have reviewed and the following portions of the patient's history were updated as appropriate: past family history, past medical history, past social history, past surgical history and problem list.    Medications:     Current Outpatient Medications:     Ascorbic Acid (VITAMIN C ER PO), Take  by mouth., Disp: , Rfl:     B Complex Vitamins (VITAMIN B COMPLEX PO), Take  by mouth., Disp: , Rfl:     carbidopa-levodopa (SINEMET)  MG per tablet, Take 1 tablet by mouth 3 (Three) Times a Day., Disp: 90 tablet, Rfl: 3    fluticasone (FLONASE) 50 MCG/ACT nasal spray, Administer 2 sprays into the nostril(s) as directed by provider Daily., Disp: , Rfl:     levocetirizine (XYZAL) 5 MG tablet, take one (1) tablet by mouth every day in the evening, Disp: , Rfl:     Multiple Vitamins-Minerals (MULTIVITAMIN ADULT PO), Take  " "by mouth., Disp: , Rfl:     omeprazole (priLOSEC) 20 MG capsule, Take 1 capsule by mouth Daily., Disp: , Rfl:     vitamin B-12 (CYANOCOBALAMIN) 100 MCG tablet, Take 0.5 tablets by mouth Daily., Disp: , Rfl:     Allergies:   No Known Allergies    Objective     Physical Exam:  Vital Signs:   Vitals:    05/20/25 1051   BP: 122/80   Pulse: 64   Temp: 98.2 °F (36.8 °C)   SpO2: 100%   Weight: 53.5 kg (118 lb)   Height: 157.5 cm (62\")   PainSc: 0-No pain     Body mass index is 21.58 kg/m².    Physical Exam  Constitutional:       Appearance: Normal appearance.   HENT:      Head: Normocephalic.   Eyes:      Conjunctiva/sclera: Conjunctivae normal.   Pulmonary:      Effort: Pulmonary effort is normal.   Musculoskeletal:         General: Normal range of motion.   Skin:     General: Skin is warm and dry.   Neurological:      General: No focal deficit present.      Mental Status: She is alert and oriented to person, place, and time.      Cranial Nerves: Cranial nerves 2-12 are intact. No dysarthria.      Motor: Motor function is intact. No tremor or pronator drift.      Coordination: Coordination is intact. Romberg sign negative. Finger-Nose-Finger Test normal.      Gait: Gait is intact.      Comments: Patient is able to go from sitting to standing on first attempt she is not bradykinetic she is walking with an upright gait she does not take short steps or scuff her toes.  She is able to complete a turn in 2-3 steps.   Psychiatric:         Attention and Perception: Attention normal.         Mood and Affect: Mood and affect normal.         Speech: Speech normal.         Behavior: Behavior normal. Behavior is cooperative.         Thought Content: Thought content normal.         Cognition and Memory: Cognition normal.         Judgment: Judgment normal.         Neurological Exam  Mental Status  Alert. Oriented to person, place, and time. Speech is normal. no dysarthria present.    Coordination    Finger-to-nose, rapid alternating " movements and heel-to-shin normal bilaterally without dysmetria.No tremor    Gait   Normal gait. Romberg is absent.      Assessment / Plan      Assessment/Plan:   Diagnoses and all orders for this visit:    1. Parkinsonian features (Primary)  -     carbidopa-levodopa (SINEMET)  MG per tablet; Take 1 tablet by mouth 3 (Three) Times a Day.  Dispense: 90 tablet; Refill: 3    2. Bradykinesia  -     carbidopa-levodopa (SINEMET)  MG per tablet; Take 1 tablet by mouth 3 (Three) Times a Day.  Dispense: 90 tablet; Refill: 3           This is a pleasant 64-year-old female patient here to follow-up with bradykinesia which has resolved with the Sinemet and parkinsonian features.  She is currently extremely happy with her current regimen and is here for medication refills.  She says her family and even her chiropractor have noted the changes in her since she has been on the Sinemet.  Will continue at same dose and follow-up in 3 months.  Indications and side effects discussed with patient she verbalizes understanding.  Patient will call in the interim if she has any questions or concerns or changes.    Follow Up:   Return in about 3 months (around 8/20/2025).    HOSSEIN Rodriguez, FNP-BC  [facility]

## 2025-08-05 ENCOUNTER — OFFICE VISIT (OUTPATIENT)
Dept: NEUROLOGY | Facility: CLINIC | Age: 65
End: 2025-08-05
Payer: MEDICARE

## 2025-08-05 VITALS
OXYGEN SATURATION: 99 % | HEIGHT: 62 IN | WEIGHT: 120 LBS | SYSTOLIC BLOOD PRESSURE: 120 MMHG | DIASTOLIC BLOOD PRESSURE: 70 MMHG | BODY MASS INDEX: 22.08 KG/M2 | HEART RATE: 77 BPM

## 2025-08-05 DIAGNOSIS — R26.9 GAIT ABNORMALITY: ICD-10-CM

## 2025-08-05 DIAGNOSIS — R25.8 BRADYKINESIA: ICD-10-CM

## 2025-08-05 DIAGNOSIS — R29.818 PARKINSONIAN FEATURES: Primary | ICD-10-CM

## 2025-08-05 RX ORDER — CARBIDOPA AND LEVODOPA 25; 100 MG/1; MG/1
1 TABLET ORAL 3 TIMES DAILY
Qty: 90 TABLET | Refills: 6 | Status: SHIPPED | OUTPATIENT
Start: 2025-08-05